# Patient Record
(demographics unavailable — no encounter records)

---

## 2025-03-31 NOTE — PHYSICAL EXAM
[Grossly Normal Strength/Tone] : grossly normal strength/tone [Coordination Grossly Intact] : coordination grossly intact [No Focal Deficits] : no focal deficits [Normal Gait] : normal gait [Alert and Oriented x3] : oriented to person, place, and time [Normal] : affect was normal and insight and judgment were intact

## 2025-03-31 NOTE — ASSESSMENT
[FreeTextEntry1] : 38 year old male here for annual exam. Labs drawn in office. Discussed colon cancer screening at 45 years old.  Tdap received 2024.  Reviewed healthy lifestyle habits to help reduce LDL cholesterol.  Follow up 1 year or sooner as needed.

## 2025-03-31 NOTE — HISTORY OF PRESENT ILLNESS
[de-identified] : 38-year-old male new patient here for annual exam. History of dyslipidemia. No acute concerns. Follows intermittent keto diet.   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

## 2025-03-31 NOTE — HEALTH RISK ASSESSMENT
[Good] : ~his/her~  mood as  good [Yes] : Yes [Monthly or less (1 pt)] : Monthly or less (1 point) [1 or 2 (0 pts)] : 1 or 2 (0 points) [Never (0 pts)] : Never (0 points) [No] : In the past 12 months have you used drugs other than those required for medical reasons? No [No falls in past year] : Patient reported no falls in the past year [0] : 2) Feeling down, depressed, or hopeless: Not at all (0) [PHQ-2 Negative - No further assessment needed] : PHQ-2 Negative - No further assessment needed [Fully functional (bathing, dressing, toileting, transferring, walking, feeding)] : Fully functional (bathing, dressing, toileting, transferring, walking, feeding) [Fully functional (using the telephone, shopping, preparing meals, housekeeping, doing laundry, using] : Fully functional and needs no help or supervision to perform IADLs (using the telephone, shopping, preparing meals, housekeeping, doing laundry, using transportation, managing medications and managing finances) [Never] : Never [FreeTextEntry1] : None [Audit-CScore] : 1 [de-identified] : Cardio, lifting weights 1 time per week [de-identified] : None [[id number]] : 0 [Change in mental status noted] : No change in mental status noted [With Family] : lives with family [Employed] : employed [College] : College [] :  [# Of Children ___] : has [unfilled] children [Sexually Active] : sexually active [Reports changes in hearing] : Reports no changes in hearing [Reports changes in vision] : Reports no changes in vision [Reports normal functional visual acuity (ie: able to read med bottle)] : Reports normal functional visual acuity [Reports changes in dental health] : Reports no changes in dental health [HIVDate] : 03/24 [HIVComments] : negative [HepatitisCDate] : 03/24 [HepatitisCComments] : negative [FreeTextEntry2] : 6th grade  [FreeTextEntry3] : daughters